# Patient Record
Sex: MALE | Race: BLACK OR AFRICAN AMERICAN | NOT HISPANIC OR LATINO | URBAN - METROPOLITAN AREA
[De-identification: names, ages, dates, MRNs, and addresses within clinical notes are randomized per-mention and may not be internally consistent; named-entity substitution may affect disease eponyms.]

---

## 2021-07-07 ENCOUNTER — EMERGENCY (EMERGENCY)
Facility: HOSPITAL | Age: 31
LOS: 1 days | Discharge: HOME | End: 2021-07-07
Attending: EMERGENCY MEDICINE | Admitting: EMERGENCY MEDICINE
Payer: SELF-PAY

## 2021-07-07 VITALS
OXYGEN SATURATION: 99 % | RESPIRATION RATE: 18 BRPM | DIASTOLIC BLOOD PRESSURE: 86 MMHG | WEIGHT: 164.91 LBS | SYSTOLIC BLOOD PRESSURE: 130 MMHG | HEART RATE: 88 BPM | TEMPERATURE: 98 F

## 2021-07-07 DIAGNOSIS — E86.0 DEHYDRATION: ICD-10-CM

## 2021-07-07 DIAGNOSIS — R51.9 HEADACHE, UNSPECIFIED: ICD-10-CM

## 2021-07-07 DIAGNOSIS — R68.2 DRY MOUTH, UNSPECIFIED: ICD-10-CM

## 2021-07-07 PROCEDURE — 99283 EMERGENCY DEPT VISIT LOW MDM: CPT

## 2021-07-07 RX ORDER — ACETAMINOPHEN 500 MG
650 TABLET ORAL ONCE
Refills: 0 | Status: COMPLETED | OUTPATIENT
Start: 2021-07-07 | End: 2021-07-07

## 2021-07-07 RX ADMIN — Medication 650 MILLIGRAM(S): at 14:38

## 2021-07-07 NOTE — ED ADULT NURSE NOTE - NSIMPLEMENTINTERV_GEN_ALL_ED
Implemented All Universal Safety Interventions:  [place] to call system. Call bell, personal items and telephone within reach. Instruct patient to call for assistance. Room bathroom lighting operational. Non-slip footwear when patient is off stretcher. Physically safe environment: no spills, clutter or unnecessary equipment. Stretcher in lowest position, wheels locked, appropriate side rails in place.

## 2021-07-07 NOTE — ED PROVIDER NOTE - CLINICAL SUMMARY MEDICAL DECISION MAKING FREE TEXT BOX
29 y/o male who states he does not have access to water despite being domiciled, presents complaining of a mild diffuse headache and dry mouth caused. Patient given Tylenol and a large jug of water. Anticipate discharge.

## 2021-07-07 NOTE — ED PROVIDER NOTE - NSFOLLOWUPINSTRUCTIONS_ED_ALL_ED_FT
Meal [employer]® CareNotes®     :  [facility]  	                       DEHYDRATION - AfterCare(R) Instructions(ER/ED)           Dehydration    WHAT YOU NEED TO KNOW:    Dehydration is a condition that develops when your body does not have enough fluid. You may become dehydrated if you do not drink enough water or lose too much fluid. Fluid loss may also cause loss of electrolytes (minerals), such as sodium.    DISCHARGE INSTRUCTIONS:    Return to the emergency department if:   •You have a seizure.      •You are confused or cannot think clearly.      •You are extremely sleepy, or another person cannot wake you.       •You become dizzy or faint when you stand.      •You are not able to urinate.      •You have trouble breathing.      •You have a fast or irregular heartbeat.      •Your hands or feet are cold, or your face is pale.       Contact your healthcare provider if:   •You have trouble drinking liquids because you are vomiting.      •Your symptoms get worse.      •You have a fever.       •You feel very weak or tired.      •You have questions or concerns about your condition or care.      Follow up with your healthcare provider as directed: Write down your questions so you remember to ask them during your visits.     Prevent or manage dehydration:   •Drink liquids as directed. Liquids that contain water, sugar, and minerals can help your body hold in fluid and help prevent dehydration. Drink liquids throughout the day, not just when you feel thirsty. Men should drink about 3 liters (13 eight-ounce cups) of liquid each day. Women should drink about 2 liters (9 eight-ounce cups) of liquid each day. Drink even more liquid if you will be outdoors, in the sun for a long time, or exercising.       •Stay cool. Limit the time you spend outdoors during the hottest part of the day. Dress in lightweight clothes.       •Keep track of how often you urinate. If you urinate less than usual or your urine is darker, drink more liquids.         © Copyright [employer] 2021           back to top                          © Copyright [employer] 2021

## 2021-07-07 NOTE — ED ADULT NURSE NOTE - OBJECTIVE STATEMENT
29 yo M presents to ed c.o headache. Pt state "I was out in the heat and started having a headache and felt dehydrated". Pt denies blurred vision, n/v/d/f/chills/cp/sob at this time.

## 2021-07-07 NOTE — ED PROVIDER NOTE - OBJECTIVE STATEMENT
31 y/o male presents to the ED complaining of dehydration because he states he doesn't have access to water. Patient is not undomiciled, but states that due to "financial difficulties" he cannot afford water. Patient presented to the ED to request water and Tylenol. Patient states that he was walking on the street today and began to feel very hot, felt a mild diffuse headache, and dry mouth so he presented to the ED. He denies chest pain, shortness of breath, dizziness, lightheadedness, or signs of infection.

## 2021-07-07 NOTE — ED PROVIDER NOTE - DR. NAME
----- Message from Barbara Phan sent at 5/17/2018  9:28 AM CDT -----  Type:  RX Refill Request    Who Called:  Patient  Refill or New Rx:  refill  RX Name and Strength:  temazepam (RESTORIL) 30 mg capsule   How is the patient currently taking it? (ex. 1XDay):  1xday  Is this a 30 day or 90 day RX:  30  Preferred Pharmacy with phone number:    [facility] [id number] [address] [address]  Phone: [phone] Fax: [phone]  [facility] [address] [place] [address] & [address] [address]  Phone: [phone] Fax: [phone]  Local or Mail Order:  local  Ordering Provider:  Same  Best Call Back Number:  [phone]  Additional Information:  Please call when completed.     
Mario Alberto

## 2021-07-07 NOTE — ED PROVIDER NOTE - PATIENT PORTAL LINK FT
09-Oct-2018 You can access the FollowMyHealth Patient Portal offered by [facility] by registering at the following website: [email or web address]. By joining [facility]’s FollowMyHealth portal, you will also be able to view your health information using other applications (apps) compatible with our system.

## 2021-10-11 NOTE — ED ADULT NURSE NOTE - CHIEF COMPLAINT QUOTE
reports a headache after being outside in the heat- pt also states " he doesn't like big crowds" show

## 2023-01-10 NOTE — ED PROVIDER NOTE - SCRIBE NAME
Dad calling regarding information that was discussed on 11/21 appointment.     He would like to know if the information that was discussed was reported to [employer] as he has not heard from anyone. They have court coming up next week.    Graciela Aj RN     Juno Spence